# Patient Record
(demographics unavailable — no encounter records)

---

## 2017-12-06 NOTE — UC
Upper Extremity HPI





- HPI Summary


HPI Summary: 





Fall down the stairs and hit right hand. THere is pain and swelling. NO other 

pain or neck pain. 





- History of Current Complaint


Chief Complaint: UCUpperExtremity


Stated Complaint: RIGHT HAND INJURY


Time Seen by Provider: 12/06/17 12:02


Hx Obtained From: Patient


Hx Last Menstrual Period: 1 yr


Onset/Duration: Sudden Onset, Lasting Hours


Severity Initially: Moderate


Severity Currently: Moderate


Location Of Pain: Is Discrete @


Character: Sharp, Aching


Aggravating Factor(s): Movement, Lifting, Flexion, Extension, Internal/External 

Rotation


Alleviating Factor(s): Rest


Associated Signs And Symptoms: Negative: Numbness/Tingling


Related History: Dominant Hand Right





- Allergies/Home Medications


Allergies/Adverse Reactions: 


 Allergies











Allergy/AdvReac Type Severity Reaction Status Date / Time


 


Levofloxacin [From Levaquin] Allergy Intermediate Hives Verified 12/06/17 12:13











Home Medications: 


 Home Medications





NK [No Home Medications Reported]  12/06/17 [History Confirmed 12/06/17]











PMH/Surg Hx/FS Hx/Imm Hx


Previously Healthy: Yes


Other History Of: 


   Negative For: Anticoagulant Therapy





- Surgical History


Surgical History: Yes


Surgery Procedure, Year, and Place: appy, tonsils and adenoids, ear tubes,





- Family History


Known Family History: Positive: Other - no hand related problems in the family.





- Social History


Alcohol Use: None


Substance Use Type: None, Prescribed


Smoking Status (MU): Heavy Every Day Tobacco Smoker





Review of Systems


Musculoskeletal: Arthralgia, Edema


All Other Systems Reviewed And Are Negative: Yes





Physical Exam


Triage Information Reviewed: Yes


Appearance: Well-Appearing, No Pain Distress, Well-Nourished


Vital Signs: 


 Initial Vital Signs











Temp  98.5 F   12/06/17 12:09


 


Pulse  90   12/06/17 12:09


 


Resp  16   12/06/17 12:09


 


BP  132/94   12/06/17 12:09











Vital Signs Reviewed: Yes


Eyes: Positive: Conjunctiva Clear


ENT: Positive: Normal ENT inspection


Neck exam: Normal


Neck: Positive: Supple, Nontender


Respiratory: Positive: No respiratory distress, No accessory muscle use.  

Negative: Respiratory distress


Cardiovascular: Positive: Brisk Capillary Refill


Abdomen Description: Negative: Distended, Guarding


Musculoskeletal: Positive: Other: - right medial hand swelling and bruising. 

There is no significant malignment of the fingers.


Neurological: Positive: Alert, Muscle Tone Normal.  Negative: Fatigued


Psychological: Positive: Normal Response To Family


Skin: Negative: rashes





Procedures





- Splinting


Location: right hand/ulnar gutter. 


Hand-Made Type: orthoglass


Splint: wrist


Pre-Proc Neuro Vasc Exam: normal


Post-Proc Neuro Vasc Exam: normal





Diagnostics





- Radiology


  ** No standard instances


Xray Interpretation: Positive (See Comments) - right fifth metacarpal fracture.


Radiology Interpretation Completed By: Radiologist





Upper Extremity Course/Dx





- Differential Dx/Diagnosis


Provider Diagnoses: right boxers fracture.





Discharge





- Discharge Plan


Condition: Good


Disposition: HOME


Patient Education Materials:  Hand Fracture (ED)


Forms:  *Work Release


Referrals: 


Neymar Espino MD [Medical Doctor] -

## 2017-12-06 NOTE — RAD
HISTORY: Traumatic right hand pain



COMPARISONS: None



VIEWS: 4, Frontal, lateral, and oblique views of the right hand



FINDINGS:



BONE DENSITY: Normal.

BONES: There is an angulated fracture of the distal fifth metacarpal, with approximately

30 degrees of volar angulation.    

JOINTS: There is no arthropathy.    

ALIGNMENT: There is no dislocation. 

SOFT TISSUES: Unremarkable.



OTHER FINDINGS: None.



IMPRESSION: 

ANGULATED FRACTURE OF THE DISTAL FIFTH METACARPAL

## 2018-04-27 NOTE — UC
FLU HPI





- HPI Summary


HPI Summary: 


Patient has 2 complaints today 1 upper respiratory tract infection with fever. 

2. continued right hand pain after a boxer's fracture has follow-up with Dr. Trujillo in 2-3 weeks








- History of Current Complaint


Chief Complaint: UCGeneralIllness


Stated Complaint: FEVER


Time Seen by Provider: 04/27/18 12:25


Hx Obtained From: Patient


Hx Last Menstrual Period: unknown, depo


Pregnant?: No


Onset/Duration: Sudden Onset


Severity Currently: Moderate


Severity Initially: Moderate


Pain Intensity: 8 - hand


Pain Scale Used: 0-10 Numeric


Associated Signs & Symptoms: Positive: Fever, Myalgia, Cough, Nasal Congestion, 

Headache


Related Hx: Possible Flu/Infectious Exposure





- Allergy/Home Medications


Allergies/Adverse Reactions: 


 Allergies











Allergy/AdvReac Type Severity Reaction Status Date / Time


 


levofloxacin [From Levaquin] Allergy  Hives Verified 04/27/18 12:07











Home Medications: 


 Home Medications





Acetaminophen [Acetaminophen Extra Strength] 1,000 mg PO Q6H PRN 04/27/18 [

History Confirmed 04/27/18]


Albuterol HFA INHALER* [Ventolin HFA Inhaler*] 1 - 2 puff INH Q4H PRN 04/27/18 [

History Confirmed 04/27/18]


Ibuprofen TAB* [Motrin TAB* 800 MG] 800 mg PO Q6H PRN 04/27/18 [History 

Confirmed 04/27/18]


medroxyPROGESTERone ACETATE* [DEPO-Provera] 150 mg IM Q3M 04/27/18 [History 

Confirmed 04/27/18]











PMH/Surg Hx/FS Hx/Imm Hx


Previously Healthy: No


Respiratory History: Asthma


Other History Of: 


   Negative For: Anticoagulant Therapy





- Surgical History


Surgical History: Yes


Surgery Procedure, Year, and Place: appy, tonsils and adenoids, ear tubes,





- Family History


Known Family History: Positive: Other - no hand related problems in the family.





- Social History


Occupation: Unemployed


Lives: With Family


Alcohol Use: None


Substance Use Type: None


Smoking Status (MU): Heavy Every Day Tobacco Smoker


Type: Cigarettes


Amount Used/How Often: 1/2ppd


Have You Smoked in the Last Year: Yes


Cessation Counseling: Patient Advised to Stop





Review of Systems


Constitutional: Fever


Skin: Negative


Eyes: Negative


ENT: Nasal Discharge


Respiratory: Cough


Cardiovascular: Negative


Gastrointestinal: Negative


Genitourinary: Negative


Motor: Negative


Neurovascular: Negative


Musculoskeletal: Arthralgia - right hand 5th mc pain


Neurological: Negative


Psychological: Negative


Is Patient Immunocompromised?: No


All Other Systems Reviewed And Are Negative: Yes





Physical Exam


Triage Information Reviewed: Yes


Appearance: Well-Nourished, Ill-Appearing - mild, Pain Distress - mild


Vital Signs: 


 Initial Vital Signs











Temp  99.1 F   04/27/18 12:04


 


Pulse  81   04/27/18 12:04


 


Resp  16   04/27/18 12:04


 


BP  121/60   04/27/18 12:04


 


Pulse Ox  99   04/27/18 12:04











Vital Signs Reviewed: Yes


Eye Exam: Normal


Eyes: Positive: Conjunctiva Clear


ENT Exam: Normal


ENT: Positive: Normal ENT inspection, Hearing grossly normal, Pharynx normal, 

TMs normal, Uvula midline.  Negative: Nasal congestion, Tonsillar swelling, 

Tonsillar exudate, Trismus, Muffled voice, Hoarse voice, Dental tenderness, 

Sinus tenderness


Dental Exam: Normal


Neck exam: Normal


Neck: Positive: Supple, Nontender, No Lymphadenopathy


Respiratory Exam: Normal


Respiratory: Positive: Chest non-tender, Lungs clear, Normal breath sounds, No 

respiratory distress, No accessory muscle use


Cardiovascular Exam: Normal


Cardiovascular: Positive: RRR, No Murmur, Pulses Normal, Brisk Capillary Refill


Musculoskeletal Exam: Normal


Musculoskeletal: Positive: Strength Intact, ROM Intact, No Edema


Neurological Exam: Normal


Neurological: Positive: Alert, Muscle Tone Normal


Psychological Exam: Normal


Skin Exam: Normal





Diagnostics





- Radiology


  ** No standard instances


Xray Interpretation: Positive (See Comments)


Radiology Interpretation Completed By: Radiologist - Healing fracture  fifth 

metacarpal





Flu Course/Dx





- Course


Course Of Treatment: And an albuterol inhaler for cough and chest congestion 

Tylenol ibuprofen for pain increase fluids.  Follow with Dr. Espino as planned 

patient refuses an Ace wrap





- Differential Dx/Diagnosis


Provider Diagnoses: Viral illness, healing fracture of the fifth metacarpal





Discharge





- Sign-Out/Discharge


Documenting (check all that apply): Discharge/Admit/Transfer





- Discharge Plan


Condition: Stable


Disposition: HOME


Prescriptions: 


Albuterol HFA INHALER* [Ventolin HFA Inhaler*] 2 puff INH Q4H PRN #1 mdi


 PRN Reason: cough/chest congestion


Patient Education Materials:  Ibuprofen (By mouth), Upper Respiratory Infection 

(ED), Viral Syndrome (ED), Arthralgia (ED)


Referrals: 


Neymar Espino MD [Medical Doctor] - As Soon As Possible





- Billing Disposition and Condition


Condition: STABLE


Disposition: HOME

## 2018-04-27 NOTE — RAD
HISTORY: History of recent fifth metatarsal fracture with recent fall and increasing pain



COMPARISONS: December 27, 2017



VIEWS: 5, Frontal, lateral, and oblique views of the right hand



FINDINGS:



BONE DENSITY: Normal.

BONES: There has been interval healing of an angulated fracture of the distal fifth

metacarpal. The fracture line is no longer evident. There is no acute fracture.    

JOINTS: There is no arthropathy.    

ALIGNMENT: There is no dislocation. 

SOFT TISSUES: Unremarkable.



OTHER FINDINGS: None.



IMPRESSION: 

HEALING FRACTURE OF THE FIFTH METACARPAL. NO ACUTE DISPLACED FRACTURE. IF SYMPTOMS

PERSIST, RECOMMEND REPEAT IMAGING

## 2018-05-23 NOTE — UC
Lower Extremity/Ankle HPI





- HPI Summary


HPI Summary: 





pt missed a step and fell injuring her L foot. c/o pain/swelling. prior hx of 

fx to that foot. denies any other injury. occured pta.





- History of Current Complaint


Stated Complaint: LFT FOOT INJURY


Time Seen by Provider: 05/23/18 13:11


Hx Obtained From: Patient


Hx Last Menstrual Period: unknown, depo


Onset/Duration: Sudden Onset


Aggravating Factor(s): Standing, Ambulation


Alleviating Factor(s): Rest


Able to Bear Weight: No





- Allergies/Home Medications


Allergies/Adverse Reactions: 


 Allergies











Allergy/AdvReac Type Severity Reaction Status Date / Time


 


levofloxacin [From Levaquin] Allergy  Hives Verified 05/23/18 13:04











Home Medications: 


 Home Medications





Buprenorphine/Naloxone SL TAB* [Suboxone 8-2 mg SL TAB*] 0.5 tab.sl SL BID 05/23 /18 [History Confirmed 05/23/18]











PMH/Surg Hx/FS Hx/Imm Hx





- Additional Past Medical History


Additional PMH: 





In recovery x 5 years


Other History Of: 


   Negative For: Anticoagulant Therapy





- Surgical History


Surgical History: Yes


Surgery Procedure, Year, and Place: appy, tonsils and adenoids, ear tubes,





- Family History


Known Family History: Positive: Other - no hand related problems in the family.





- Social History


Occupation: Unemployed


Lives: With Family


Alcohol Use: None


Substance Use Type: None


Smoking Status (MU): Heavy Every Day Tobacco Smoker


Type: Cigarettes


Amount Used/How Often: 1/2ppd


Have You Smoked in the Last Year: Yes





- Immunization History


Vaccination Up to Date: Yes





Review of Systems


Constitutional: Negative


Skin: Negative


Eyes: Negative


ENT: Negative


Respiratory: Negative


Cardiovascular: Negative


Gastrointestinal: Negative


Genitourinary: Negative


Motor: Negative


Neurovascular: Negative


Musculoskeletal: Other: - pain/swelling L foot


Neurological: Negative


Psychological: Negative


Is Patient Immunocompromised?: No


All Other Systems Reviewed And Are Negative: Yes





Physical Exam


Triage Information Reviewed: Yes


Appearance: Well-Appearing


Vital Signs Reviewed: Yes


Eyes: Positive: Conjunctiva Clear


ENT: Positive: Normal ENT inspection


Neck: Positive: Supple, Nontender


Respiratory: Positive: Lungs clear, Normal breath sounds


Cardiovascular: Positive: RRR, No Murmur


Abdomen Description: Positive: Nontender, No Organomegaly, Soft


Bowel Sounds: Positive: Present


Musculoskeletal: Positive: Other: - LLE: dorsal foot with tenderness and 

swelling. s/v/m iontact to toes. Rest of LLE is atraumatic.


Neurological: Positive: Alert


Psychological: Positive: Age Appropriate Behavior


Skin Exam: Normal





Diagnostics





- Radiology


  ** No standard instances


Xray Interpretation: No Acute Changes


Radiology Interpretation Completed By: Radiologist - foot=no fx





Lower Extremity Course/Dx





- Course


Course Of Treatment: no fx or dislocation.





- Differential Dx/Diagnosis


Provider Diagnoses: sprain left foot





Discharge





- Sign-Out/Discharge


Documenting (check all that apply): Discharge/Admit/Transfer





- Discharge Plan


Condition: Stable


Disposition: HOME


Prescriptions: 


Naproxen [Naprosyn 500 mg tab] 500 mg PO BID #10 tablet


Patient Education Materials:  Foot Sprain (ED)


Forms:  *Work Release


Referrals: 


Neymar Espino MD [Medical Doctor] - 5 Days


Additional Instructions: 


ace, post op shoe and crutches until cleared





- Billing Disposition and Condition


Condition: STABLE


Disposition: HOME

## 2018-05-23 NOTE — RAD
HISTORY: Fall, fourth and fifth metatarsal pain



COMPARISONS: None



VIEWS: 3, Frontal, lateral, and oblique views of the left foot



FINDINGS:



BONE DENSITY: Normal.

BONES: There is no displaced fracture.    

JOINTS: There is no arthropathy.    

ALIGNMENT: There is no dislocation. 

SOFT TISSUES: Unremarkable.



OTHER FINDINGS: None.



IMPRESSION: 

NO ACUTE OSSEOUS INJURY. IF SYMPTOMS PERSIST, RECOMMEND REPEAT IMAGING.